# Patient Record
Sex: FEMALE | Race: OTHER | NOT HISPANIC OR LATINO | URBAN - METROPOLITAN AREA
[De-identification: names, ages, dates, MRNs, and addresses within clinical notes are randomized per-mention and may not be internally consistent; named-entity substitution may affect disease eponyms.]

---

## 2019-12-21 PROBLEM — Z00.00 ENCOUNTER FOR PREVENTIVE HEALTH EXAMINATION: Status: ACTIVE | Noted: 2019-12-21

## 2023-02-18 ENCOUNTER — EMERGENCY (EMERGENCY)
Facility: HOSPITAL | Age: 73
LOS: 1 days | Discharge: HOME | End: 2023-02-18
Attending: STUDENT IN AN ORGANIZED HEALTH CARE EDUCATION/TRAINING PROGRAM | Admitting: EMERGENCY MEDICINE
Payer: MEDICARE

## 2023-02-18 VITALS
SYSTOLIC BLOOD PRESSURE: 143 MMHG | DIASTOLIC BLOOD PRESSURE: 67 MMHG | OXYGEN SATURATION: 97 % | HEART RATE: 68 BPM | RESPIRATION RATE: 16 BRPM | TEMPERATURE: 98 F

## 2023-02-18 LAB
ALBUMIN SERPL ELPH-MCNC: 4.3 G/DL — SIGNIFICANT CHANGE UP (ref 3.3–5)
ALP SERPL-CCNC: 71 U/L — SIGNIFICANT CHANGE UP (ref 40–120)
ALT FLD-CCNC: 15 U/L — SIGNIFICANT CHANGE UP (ref 4–33)
ANION GAP SERPL CALC-SCNC: 10 MMOL/L — SIGNIFICANT CHANGE UP (ref 7–14)
APPEARANCE UR: CLEAR — SIGNIFICANT CHANGE UP
APTT BLD: 32.3 SEC — SIGNIFICANT CHANGE UP (ref 27–36.3)
AST SERPL-CCNC: 22 U/L — SIGNIFICANT CHANGE UP (ref 4–32)
BASE EXCESS BLDV CALC-SCNC: 2.2 MMOL/L — SIGNIFICANT CHANGE UP (ref -2–3)
BASOPHILS # BLD AUTO: 0.04 K/UL — SIGNIFICANT CHANGE UP (ref 0–0.2)
BASOPHILS NFR BLD AUTO: 0.7 % — SIGNIFICANT CHANGE UP (ref 0–2)
BILIRUB SERPL-MCNC: 0.2 MG/DL — SIGNIFICANT CHANGE UP (ref 0.2–1.2)
BILIRUB UR-MCNC: NEGATIVE — SIGNIFICANT CHANGE UP
BLOOD GAS VENOUS COMPREHENSIVE RESULT: SIGNIFICANT CHANGE UP
BUN SERPL-MCNC: 15 MG/DL — SIGNIFICANT CHANGE UP (ref 7–23)
CALCIUM SERPL-MCNC: 9.8 MG/DL — SIGNIFICANT CHANGE UP (ref 8.4–10.5)
CHLORIDE BLDV-SCNC: 105 MMOL/L — SIGNIFICANT CHANGE UP (ref 96–108)
CHLORIDE SERPL-SCNC: 104 MMOL/L — SIGNIFICANT CHANGE UP (ref 98–107)
CO2 BLDV-SCNC: 30.8 MMOL/L — HIGH (ref 22–26)
CO2 SERPL-SCNC: 27 MMOL/L — SIGNIFICANT CHANGE UP (ref 22–31)
COLOR SPEC: SIGNIFICANT CHANGE UP
CREAT SERPL-MCNC: 0.69 MG/DL — SIGNIFICANT CHANGE UP (ref 0.5–1.3)
DIFF PNL FLD: NEGATIVE — SIGNIFICANT CHANGE UP
EGFR: 92 ML/MIN/1.73M2 — SIGNIFICANT CHANGE UP
EOSINOPHIL # BLD AUTO: 0.14 K/UL — SIGNIFICANT CHANGE UP (ref 0–0.5)
EOSINOPHIL NFR BLD AUTO: 2.3 % — SIGNIFICANT CHANGE UP (ref 0–6)
FLUAV AG NPH QL: SIGNIFICANT CHANGE UP
FLUBV AG NPH QL: SIGNIFICANT CHANGE UP
GAS PNL BLDV: 138 MMOL/L — SIGNIFICANT CHANGE UP (ref 136–145)
GLUCOSE BLDV-MCNC: 79 MG/DL — SIGNIFICANT CHANGE UP (ref 70–99)
GLUCOSE SERPL-MCNC: 87 MG/DL — SIGNIFICANT CHANGE UP (ref 70–99)
GLUCOSE UR QL: NEGATIVE — SIGNIFICANT CHANGE UP
HCO3 BLDV-SCNC: 29 MMOL/L — SIGNIFICANT CHANGE UP (ref 22–29)
HCT VFR BLD CALC: 38.8 % — SIGNIFICANT CHANGE UP (ref 34.5–45)
HCT VFR BLDA CALC: 40 % — SIGNIFICANT CHANGE UP (ref 34.5–46.5)
HGB BLD CALC-MCNC: 13.2 G/DL — SIGNIFICANT CHANGE UP (ref 11.7–16.1)
HGB BLD-MCNC: 12.7 G/DL — SIGNIFICANT CHANGE UP (ref 11.5–15.5)
IANC: 3.43 K/UL — SIGNIFICANT CHANGE UP (ref 1.8–7.4)
IMM GRANULOCYTES NFR BLD AUTO: 0.2 % — SIGNIFICANT CHANGE UP (ref 0–0.9)
INR BLD: 0.99 RATIO — SIGNIFICANT CHANGE UP (ref 0.88–1.16)
KETONES UR-MCNC: NEGATIVE — SIGNIFICANT CHANGE UP
LACTATE BLDV-MCNC: 0.9 MMOL/L — SIGNIFICANT CHANGE UP (ref 0.5–2)
LEUKOCYTE ESTERASE UR-ACNC: NEGATIVE — SIGNIFICANT CHANGE UP
LYMPHOCYTES # BLD AUTO: 2.08 K/UL — SIGNIFICANT CHANGE UP (ref 1–3.3)
LYMPHOCYTES # BLD AUTO: 33.9 % — SIGNIFICANT CHANGE UP (ref 13–44)
MCHC RBC-ENTMCNC: 28.4 PG — SIGNIFICANT CHANGE UP (ref 27–34)
MCHC RBC-ENTMCNC: 32.7 GM/DL — SIGNIFICANT CHANGE UP (ref 32–36)
MCV RBC AUTO: 86.8 FL — SIGNIFICANT CHANGE UP (ref 80–100)
MONOCYTES # BLD AUTO: 0.44 K/UL — SIGNIFICANT CHANGE UP (ref 0–0.9)
MONOCYTES NFR BLD AUTO: 7.2 % — SIGNIFICANT CHANGE UP (ref 2–14)
NEUTROPHILS # BLD AUTO: 3.43 K/UL — SIGNIFICANT CHANGE UP (ref 1.8–7.4)
NEUTROPHILS NFR BLD AUTO: 55.7 % — SIGNIFICANT CHANGE UP (ref 43–77)
NITRITE UR-MCNC: NEGATIVE — SIGNIFICANT CHANGE UP
NRBC # BLD: 0 /100 WBCS — SIGNIFICANT CHANGE UP (ref 0–0)
NRBC # FLD: 0 K/UL — SIGNIFICANT CHANGE UP (ref 0–0)
PCO2 BLDV: 54 MMHG — HIGH (ref 39–52)
PH BLDV: 7.34 — SIGNIFICANT CHANGE UP (ref 7.32–7.43)
PH UR: 6 — SIGNIFICANT CHANGE UP (ref 5–8)
PLATELET # BLD AUTO: 232 K/UL — SIGNIFICANT CHANGE UP (ref 150–400)
PO2 BLDV: 26 MMHG — SIGNIFICANT CHANGE UP (ref 25–45)
POTASSIUM BLDV-SCNC: 3.4 MMOL/L — LOW (ref 3.5–5.1)
POTASSIUM SERPL-MCNC: 3.8 MMOL/L — SIGNIFICANT CHANGE UP (ref 3.5–5.3)
POTASSIUM SERPL-SCNC: 3.8 MMOL/L — SIGNIFICANT CHANGE UP (ref 3.5–5.3)
PROT SERPL-MCNC: 7.2 G/DL — SIGNIFICANT CHANGE UP (ref 6–8.3)
PROT UR-MCNC: NEGATIVE — SIGNIFICANT CHANGE UP
PROTHROM AB SERPL-ACNC: 11.5 SEC — SIGNIFICANT CHANGE UP (ref 10.5–13.4)
RBC # BLD: 4.47 M/UL — SIGNIFICANT CHANGE UP (ref 3.8–5.2)
RBC # FLD: 12.6 % — SIGNIFICANT CHANGE UP (ref 10.3–14.5)
RSV RNA NPH QL NAA+NON-PROBE: SIGNIFICANT CHANGE UP
SAO2 % BLDV: 37.4 % — LOW (ref 67–88)
SARS-COV-2 RNA SPEC QL NAA+PROBE: SIGNIFICANT CHANGE UP
SODIUM SERPL-SCNC: 141 MMOL/L — SIGNIFICANT CHANGE UP (ref 135–145)
SP GR SPEC: 1 — LOW (ref 1.01–1.05)
TROPONIN T, HIGH SENSITIVITY RESULT: <6 NG/L — SIGNIFICANT CHANGE UP
UROBILINOGEN FLD QL: SIGNIFICANT CHANGE UP
WBC # BLD: 6.14 K/UL — SIGNIFICANT CHANGE UP (ref 3.8–10.5)
WBC # FLD AUTO: 6.14 K/UL — SIGNIFICANT CHANGE UP (ref 3.8–10.5)

## 2023-02-18 PROCEDURE — 73630 X-RAY EXAM OF FOOT: CPT | Mod: 26,LT

## 2023-02-18 PROCEDURE — 71045 X-RAY EXAM CHEST 1 VIEW: CPT | Mod: 26

## 2023-02-18 PROCEDURE — 99223 1ST HOSP IP/OBS HIGH 75: CPT

## 2023-02-18 PROCEDURE — 70450 CT HEAD/BRAIN W/O DYE: CPT | Mod: 26,MA

## 2023-02-18 RX ORDER — TETANUS TOXOID, REDUCED DIPHTHERIA TOXOID AND ACELLULAR PERTUSSIS VACCINE, ADSORBED 5; 2.5; 8; 8; 2.5 [IU]/.5ML; [IU]/.5ML; UG/.5ML; UG/.5ML; UG/.5ML
0.5 SUSPENSION INTRAMUSCULAR ONCE
Refills: 0 | Status: COMPLETED | OUTPATIENT
Start: 2023-02-18 | End: 2023-02-18

## 2023-02-18 RX ORDER — ACETAMINOPHEN 500 MG
975 TABLET ORAL ONCE
Refills: 0 | Status: COMPLETED | OUTPATIENT
Start: 2023-02-18 | End: 2023-02-18

## 2023-02-18 RX ORDER — ACETAMINOPHEN 500 MG
975 TABLET ORAL EVERY 6 HOURS
Refills: 0 | Status: DISCONTINUED | OUTPATIENT
Start: 2023-02-18 | End: 2023-02-22

## 2023-02-18 RX ADMIN — TETANUS TOXOID, REDUCED DIPHTHERIA TOXOID AND ACELLULAR PERTUSSIS VACCINE, ADSORBED 0.5 MILLILITER(S): 5; 2.5; 8; 8; 2.5 SUSPENSION INTRAMUSCULAR at 16:31

## 2023-02-18 RX ADMIN — Medication 975 MILLIGRAM(S): at 18:44

## 2023-02-18 RX ADMIN — Medication 975 MILLIGRAM(S): at 16:30

## 2023-02-18 NOTE — ED ADULT NURSE NOTE - CHIEF COMPLAINT QUOTE
pt had unwitnessed fall while walking on sidewalk today. Pt's  was walking in front of pt and found pt on ground laying on her left side. As per daughter, pt's  stated to daughter that pt was "just coming to" when he noticed her on ground. Pt states "I don't know how I fell". Pt unsure if she passed out but states she just remembers herself on ground. Abrasion noted to left upper lip area. Bruise to right knee, and left great toe pain. Denies any PMHx.

## 2023-02-18 NOTE — ED ADULT TRIAGE NOTE - WAS YOUR LAST COVID-19 VACCINE GREATER THAN OR EQUAL TO TWO MONTHS AGO?
Yes normal/airway patent/breath sounds equal/good air movement/respirations non-labored/clear to auscultation bilaterally/no chest wall tenderness/no intercostal retractions/no rales/no rhonchi/no subcutaneous emphysema/no wheezes

## 2023-02-18 NOTE — ED ADULT TRIAGE NOTE - CHIEF COMPLAINT QUOTE
pt had unwitnessed fall while walking on sidewalk today. Pt's  was walking in front of pt and found pt on ground laying on her left side. As per daughter, pt's  stated to her that pt was "just coming to" when he noticed her on ground. Pt states "I don't know how I fell". Pt unsure if she passed out but states she just remembers herself on ground. Abrasion noted to left upper lip area. Bruise to right knee, and left great toe pain. Denies any PMHx. pt had unwitnessed fall while walking on sidewalk today. Pt's  was walking in front of pt and found pt on ground laying on her left side. As per daughter, pt's  stated to daughter that pt was "just coming to" when he noticed her on ground. Pt states "I don't know how I fell". Pt unsure if she passed out but states she just remembers herself on ground. Abrasion noted to left upper lip area. Bruise to right knee, and left great toe pain. Denies any PMHx.

## 2023-02-18 NOTE — ED PROVIDER NOTE - ATTENDING CONTRIBUTION TO CARE
Dr. Aparicio, Attending Physician-  I performed a face to face bedside interview with patient regarding history of present illness, review of symptoms and past medical history. I completed an independent physical exam.  I have discussed patient's plan of care with the resident.    72F, no pmh, who presents after a syncopal episode. Was walking with her . Then the next thing she remembers is awaking on the ground. Denies any prodromal sxs at all - no preceding chest pain, nausea, vertigo, dizziness, diaphoresis. Has generally felt fatigued since returning from [facility] yesterday.  +facial abrasions from the fall. Last stress test was 3 years ago without focal pathology. No complaints at this time - no headaches, fevers, chills, cough, sputum, cp, sob, belly pain, nvd. Physical: afebrile, well appearing, rrr, upper lip/philtrum abrasions, ctabl, abdomen soft, no le edema. Plan: syncope evaluation - labs, ekg, trop, admit for syncope.

## 2023-02-18 NOTE — ED ADULT NURSE REASSESSMENT NOTE - NS ED NURSE REASSESS COMMENT FT1
A&Ox4. Patient still has pain in her head but reduced from earlier. Respirations even and unlabored, sating 100% on RA. Sinus nathalia on the monitor. Safety precautions in place. Awaiting results from CT scan. Denies N/V, SOB, or distress.

## 2023-02-18 NOTE — ED PROVIDER NOTE - PHYSICAL EXAMINATION
General: WN/WD NAD  Head: abrasions to L upper lip, normocephalic  Eyes: EOM grossly in tact, no scleral icterus, no discharge  ENT: moist mucous membranes  Neurology: A&Ox 3, PERRL, 5/5 strength and normal sensation throughout all four extremities.  no facial asymmetry.  Respiratory: CTAB, no wheezing, normal respiratory effort  CV: RRR, good s1/s2, no S3, Extremities warm and well perfused  Abdominal: Soft, non-distended, non-tender, no masses  Extremities: No edema, no deformities  Skin: warm and dry. No rashes

## 2023-02-18 NOTE — ED CDU PROVIDER INITIAL DAY NOTE - PHYSICAL EXAMINATION
CONSTITUTIONAL:  Well appearing, awake, alert, oriented to person, place, time/situation and in no apparent distress.  Pt. is objectively comfortable appearing and verbalizing in full, clear, effortless sentences.  ENMT: Superficial minor-appearing facial abrasions with no active bleeding noted.  Airway patent.  Nasal mucosa clear.  Moist mucous membranes.  Neck supple, no midline TTP.  EYES:  Clear OU.  EOMI OU.  CARDIAC:  Normal rate, regular rhythm; HR 60 on cardiac monitor on exam.  Heart sounds S1 S2.  No murmurs, gallops, or rubs.  RESPIRATORY:  Breath sounds clear and equal bilaterally.  No wheezes, no rales, no rhonchi.  GASTROINTESTINAL:  Abdomen soft, non-distended, non-tender.  No rebound, no guarding.  NEUROLOGICAL:  Alert and oriented to person/place/time/situation.  No focal deficits; no tremors noted.   MUSCULOSKELETAL:  Range of motion is not limited.  Gait stable.    SKIN:  Skin color unremarkable.  Skin warm, dry.   PSYCHIATRIC:  Alert and oriented to person/place/time/situation.  Mood and affect WNL.  No apparent risk to self or others.

## 2023-02-18 NOTE — ED ADULT NURSE NOTE - NSIMPLEMENTINTERV_GEN_ALL_ED
Implemented All Fall Risk Interventions:  [place] to call system. Call bell, personal items and telephone within reach. Instruct patient to call for assistance. Room bathroom lighting operational. Non-slip footwear when patient is off stretcher. Physically safe environment: no spills, clutter or unnecessary equipment. Stretcher in lowest position, wheels locked, appropriate side rails in place. Provide visual cue, wrist band, yellow gown, etc. Monitor gait and stability. Monitor for mental status changes and reorient to person, place, and time. Review medications for side effects contributing to fall risk. Reinforce activity limits and safety measures with patient and family.

## 2023-02-18 NOTE — ED ADULT NURSE REASSESSMENT NOTE - NS ED NURSE REASSESS COMMENT FT1
Pt alert and orientedx4, in no apparent distress. Pt denies chest pain, SOB, lightheadedness, pain. Pt going to CDU, report given to Rn pt going to spot 1

## 2023-02-18 NOTE — ED ADULT NURSE NOTE - CHIEF COMPLAINT
Refill Authorization Note     is requesting a refill authorization.    Brief assessment and rationale for refill: QUICK DC: RTS 3/18/NEEDS LABS  Amount/Quantity of medication ordered: 90d        Refills Authorized: No           Medication-related problems identified: Requires labs  Medication Therapy Plan: Labs WNL 2/18; DM LCO 10/18 doing well on present regimen; NTBO (CMP/CBC/Lipid)/NTBS (CMP/CBC/Lipid/A1C); quick dc RTS 3/19  Name and strength of medication: JANUMET 50/500MG TABLETS  How patient will take medication: t1t bid   Medication reconciliation completed: No        Comments:    The patient is a 72y Female complaining of fall.

## 2023-02-18 NOTE — ED ADULT NURSE NOTE - OBJECTIVE STATEMENT
Pt received to rm 11 presents s/p fall earlier today with abrasion noted to left upper lip. Pt accompanied by family who reports pt was walking with  when she fell to ground. Pt denies remembering what occurred. Pt currently a&ox4, ambulatory with assistance, respirations even and unlabored, abd soft and non-distended non tender to palpation. Pt denies CP, SOB, fever, chills, dizziness,  symptoms. Resident at bedside assessing pt, will await orders and continue to monitor.

## 2023-02-18 NOTE — ED CDU PROVIDER INITIAL DAY NOTE - OBJECTIVE STATEMENT
71 yo F p/w c/o of syncope earlier today. She has never syncopized before. Denies any symptoms preceding the event.  She states that she woke up on the ground and was confused because she was not sure what occurred.  She is complaining of some facial trauma and abrasions along her upper lip.  According to her.  She was returning from [facility] recently and has felt fatigued since then.  Though denies nausea, vomiting, diarrhea, chest pain, dyspnea, cough, sore throat, dysuria, urinary frequency, fever, chills.  Patient is complaining of some pain in her left foot.  She last had a stress test 3 years ago which was normal but has not had one since. She has been ambulatory since the accident. She has a slight COSTA.    CDU SUMAYA Merino Note----  ED Provider HPI as above, reviewed.  Pt. is a 71 yo female, no stated PMH/PSH; no home meds; presented to the ED s/p syncopal episode, with no prior hx/o syncope in the past.  Pt was walking with spouse when incident occurred; no prodromal symptoms as per pt.  In the ED, VSS; EKG sinus bradycardia @ 59 bmp with no acute/ischemic findings noted.  CBC/coags/CMP all unremarkable; trop <6; VBG: lactate 0.9, pH 7.34; UA: neg leuk/nitrite/ketones.  COVID/FLU/RSV: NotDetected.  CXR and left foot x-rays were performed with no acute pathology noted.  CT head was performed: "....IMPRESSION:  No evidence of acute intracranial hemorrhage, midline shift or CT evidence of acute territorial infarct....".  Pt rec'd tetanus vaccination.  Pt was dispo'd to CDU for continued care plan:  Tele monitoring, echo, Tele Doc of Day evaluation, general observation care / monitoring.  On CDU evaluation, pt with no active c/o.  Pt denies dizziness, weakness, pain/discomfort to any part of her body.  Pt ambulated without complaints of pain or discomfort.

## 2023-02-18 NOTE — ED PROVIDER NOTE - CLINICAL SUMMARY MEDICAL DECISION MAKING FREE TEXT BOX
73 yo F p/w c/o of syncope earlier today. She has never syncopized before. Denies any symptoms preceding the event. Differential diagnosis includes but is not limited to ACS, infection, metabolic derangement, arrhythmia, cardiac abnormality, PTX, vasovagal. Pt well appearing with normal neuro exam. No sig PMH. Given sudden onset with no prodrome c/o cardiac etiology. will place on tele, get ecg, cxr, and ct to eval for pathology. Pending w/u would either place in observation for echo/stress or admit for syncope w/u and continous tele monitoring.

## 2023-02-18 NOTE — ED PROVIDER NOTE - OBJECTIVE STATEMENT
73 yo F p/w c/o of syncope earlier today. She has never syncopized before. Denies any symptoms preceding the event.  She states that she woke up on the ground and was confused because she was not sure what occurred.  She is complaining of some facial trauma and abrasions along her upper lip.  According to her.  She was returning from [facility] recently and has felt fatigued since then.  Though denies nausea, vomiting, diarrhea, chest pain, dyspnea, cough, sore throat, dysuria, urinary frequency, fever, chills.  Patient is complaining of some pain in her left foot.  She last had a stress test 3 years ago which was normal but has not had one since. She has been ambulatory since the accident. She has a slight HA.

## 2023-02-18 NOTE — ED PROVIDER NOTE - PROGRESS NOTE DETAILS
Pily Greenwood PGY2: I received sign out on this patient. I have reassessed patient and agree with the current plan. Will follow-up labs/imaging. CT pending then CDU. Maldonado Alexander, PGY-3- patient received at sign out. Pt pending CT head for syncopal work up. Will place in CDU for echo/tele monitoring. Unclear etiology of syncope. Pt at baseline currently and ambulatory.

## 2023-02-19 VITALS
OXYGEN SATURATION: 100 % | RESPIRATION RATE: 16 BRPM | SYSTOLIC BLOOD PRESSURE: 118 MMHG | HEART RATE: 63 BPM | DIASTOLIC BLOOD PRESSURE: 66 MMHG | TEMPERATURE: 98 F

## 2023-02-19 PROCEDURE — 99238 HOSP IP/OBS DSCHRG MGMT 30/<: CPT

## 2023-02-19 PROCEDURE — 93306 TTE W/DOPPLER COMPLETE: CPT | Mod: 26

## 2023-02-19 NOTE — ED CDU PROVIDER SUBSEQUENT DAY NOTE - RESPIRATORY NEGATIVE STATEMENT, MLM
Addended by: Liane Blas on: 8/8/2022 10:24 AM     Modules accepted: Orders
no chest pain, no cough, and no shortness of breath.

## 2023-02-19 NOTE — ED CDU PROVIDER SUBSEQUENT DAY NOTE - ATTENDING APP SHARED VISIT CONTRIBUTION OF CARE
Otherwise healthy female with no past medical history presents to the emergency department after possible syncope episode yesterday.  Patient had unwitnessed fall was found on the ground and brought to the emergency department.  Patient with facial abrasions, CT head in the emergency department did not show any evidence of intracranial hemorrhage.  Patient had a nonfocal neurological exam.  States there was no prodrome prior to event, denies dizziness, lightheadedness, chest pain, shortness of breath, severe headache, nausea, vomiting.  Patient was sent to the CDU for telemetry, echo for possible syncope.  Patient this morning states she is feeling well, has been ambulating around the observation unit without issue though she does note some left pain around the MTP joint.  Had x-ray done yesterday without any acute fracture.  Patient thinks she may have fallen because she stubbed her toe which caused her to fall, potentially may not have actually been a syncopal episode.  If echocardiogram without any concerning findings patient to be discharged with primary care follow-up.

## 2023-02-19 NOTE — ED CDU PROVIDER DISPOSITION NOTE - PATIENT PORTAL LINK FT
You can access the FollowMyHealth Patient Portal offered by [facility] by registering at the following website: [email or web address]. By joining [employer]’s FollowMyHealth portal, you will also be able to view your health information using other applications (apps) compatible with our system.

## 2023-02-19 NOTE — ED CDU PROVIDER DISPOSITION NOTE - NSFOLLOWUPINSTRUCTIONS_ED_ALL_ED_FT
[facility]  Cardiology  [address] [place] [address]  Phone: [phone]  Fax:   Follow Up Time:     [facility]  Cardiology  [address], [address]  [place] [address]  Phone: [phone]  Fax:   Follow Up Time:     [facility]  Cardiology  [address] [place] [address]  Phone: [phone]  Fax:   Follow Up Time:     Near Syncope    WHAT YOU NEED TO KNOW:    Near syncope, also called presyncope, is the feeling that you may faint (lose consciousness), but you do not. You can control some health conditions that cause near syncope. Your healthcare providers can help you create a plan to manage near syncope and prevent episodes.    DISCHARGE INSTRUCTIONS:    Seek care immediately if:    You have sudden chest pain.    You have trouble breathing or shortness of breath.    You have vision changes, are sweating, and have nausea while you are sitting or lying down.    You feel dizzy or flushed and your heart is fluttering.    You lose consciousness.    You cannot use your arm, hand, foot, or leg, or it feels weak.    You have trouble speaking or understanding others when they speak.  Contact your healthcare provider if:    You have new or worsening symptoms.    Your heart beats faster or slower than usual.    You have questions or concerns about your condition or care.  Medicines:    Medicines may be needed to help your heart pump strongly and regularly. Your healthcare provider may also make changes to any medicines that are causing syncope.    Take your medicine as directed. Contact your healthcare provider if you think your medicine is not helping or if you have side effects. Tell him or her if you are allergic to any medicine. Keep a list of the medicines, vitamins, and herbs you take. Include the amounts, and when and why you take them. Bring the list or the pill bottles to follow-up visits. Carry your medicine list with you in case of an emergency.  Follow up with your healthcare provider as directed: You may need more tests to help find the cause of your near syncope episodes. The tests will help healthcare providers plan the best treatment for you. Write down your questions so you remember to ask them during your visits.    Manage near syncope:    Sit or lie down when needed. This includes when you feel dizzy, your throat is getting tight, and your vision changes. Raise your legs above the level of your heart.    Take slow, deep breaths if you start to breathe faster with anxiety or fear. This can help decrease dizziness and the feeling that you might faint.    Keep a record of your near syncope episodes. Include your symptoms and your activity before and after the episode. The record can help your healthcare provider find the cause of your near syncope and help you manage episodes.  Prevent a near syncope episode:    Move slowly and let yourself get used to one position before you move to another position. This is very important when you change from a lying or sitting position to a standing position. Take some deep breaths before you stand up from a lying position. Stand up slowly. Sudden movements may cause a fainting spell. Sit on the side of the bed or couch for a few minutes before you stand up. If you are on bedrest, try to be upright for about 2 hours each day, or as directed. Do not lock your legs if you are standing for a long period of time. Move your legs and bend your knees to keep blood flowing.    Follow your healthcare provider's recommendations. Your provider may recommend that you drink more liquids to prevent dehydration. You may also need to have more salt to keep your blood pressure from dropping too low and causing syncope. Your provider will tell you how much liquid and sodium to have each day. He or she will also tell you how much physical activity is safe for you. This will depend on what is causing your near syncope.    Watch for signs of low blood sugar. These include hunger, nervousness, sweating, and fast or fluttery heartbeats. Talk with your healthcare provider about ways to keep your blood sugar level steady.    Check your blood pressure often. This is important if you take medicine to lower your blood pressure. Check your blood pressure when you are lying down and when you are standing. Ask how often to check during the day. Keep a record of your blood pressure numbers. Your healthcare provider may use the record to help plan your treatment.  How to take a Blood Pressure      Do not strain if you are constipated. You may faint if you strain to have a bowel movement. Walking is the best way to get your bowels moving. Eat foods high in fiber to make it easier to have a bowel movement. Good examples are high-fiber cereals, beans, vegetables, and whole-grain breads. Prune juice may help make bowel movements softer.    Presíncope    LO QUE NECESITA SABER:    Un presíncope es la sensación de que usted se puede desmayar (perder el conocimiento), jeffrey no lo hace. Usted puede controlar ciertas afecciones médicas que provocan presíncope. Gilda médicos pueden ayudarlo a crear un plan para controlar el presíncope y evitar la ocurrencia de episodios.    INSTRUCCIONES SOBRE EL [facility] HOSPITAL[facility]:    Busque atención médica de inmediato si:    Tiene dolor repentino en el pecho.    Usted tiene dificultad para respirar o le falta el aire.    Usted tiene cambios en la visión, está sudando y tiene náuseas mientras está sentado o acostado.    Usted se siente mareado o acalorado y bella corazón le está aleteando christa si se le fuera a salir.    Usted pierde el conocimiento.    Usted no puede controlar un brazo, alesia mano, un pie o alesia pierna, o los siente débiles.    Tiene problemas para hablar o entender a otros cuando hablan.  Comuníquese con bella médico si:    Usted tiene nuevos síntomas o gilda síntomas empeoran.    Bella corazón late más rápido o más despacio que lo acostumbrado.    Usted tiene preguntas o inquietudes acerca de bella condición o cuidado.  Medicamentos:    Los medicamentospodrían administrarse para ayudar a que bella corazón ai con fuerza y regularidad. Bella médico podría hacer cambios a cualquier medicamento que le esté causando síncope.    Rivers gilda medicamentos christa se le haya indicado.Consulte con bella médico si usted zachery que bella medicamento no le está ayudando o si presenta efectos secundarios. Infórmele si es alérgico a cualquier medicamento. Mantenga alesia lista actualizada de los medicamentos, las vitaminas y los productos herbales que divina. Incluya los siguientes datos de los medicamentos: cantidad, frecuencia y motivo de administración. Traiga con usted la lista o los envases de las píldoras a gilda citas de seguimiento. Lleve la lista de los medicamentos con usted en jorge luis de alesia emergencia.  Acuda a gilda consultas de control con bella médico según le indicaron.Usted podría necesitar más exámenes para averiguar la causa de gilda episodios de presíncope. Los exámenes ayudarán a bella médico a planificar el mejor tratamiento para usted. Anote gilda preguntas para que se acuerde de hacerlas jonelle gilda visitas.    Control del presíncope:    Siéntese o acuéstese cuando sea necesario.Elfers incluye cuando se sienta mareado, bella garganta se cierre o note cambios en bella visión. Eleve gilda piernas por encima del nivel de bella corazón.    Inhale lenta y profundamente si comienza a respirar más rápido a causa de la ansiedad o el temor.Elfers puede disminuir el mareo y la sensación de que se va a desmayar.    Mantenga un registro de los episodios de presíncope.Incluya los síntomas y la actividad que realiza antes y después del episodio. El registro puede ayudar a bella médico a determinar la causa de bella presíncope y ayudarlo a controlar los episodios.  Prevención de un episodio de presíncope:    Muévase lentamente y acostúmbrese a alesia posición antes de moverse a otra.Elfers es muy importante cuando usted se cambie de alesia posición acostada o sentada a alesia posición de pie. Respire profundo varias veces antes de ponerse de pie después de eveline estado acostado. Póngase de pie lentamente. Los movimientos repentinos podrían causar desmayos. Siéntese en el borde de la cama o del sofá por unos minutos antes de ponerse de pie. En jorge luis que esté guardando cama, debe tratar de estar en alesia posición vertical por lo menos por 2 horas todos los días o christa se lo indicaron. No inmovilice las piernas cuando esté de pie jonelle un slivina periodo de tiempo. Mueva las piernas y doble las rodillas para mantener el flujo de genet.    Siga las recomendaciones de bella médico.El médico puede recomendarle que ingiera más líquidos para evitar la deshidratación. Es probable que usted también deba aumentar bella consumo de sal para evitar que bella presión arterial baje demasiado y ocurra un síncope. El médico le dirá cuánto líquido y sodio debe consumir cada día. También le dirá cuánta actividad física es kevin para usted. Elfers dependerá de la causa de bella presíncope.    Esté atento a los signos de bajo nivel de azúcar en la genet.Los signos incluyen hambre, nerviosismo, sudoración y latidos cardíacos rápidos o palpitantes. Consulte con bella médico sobre métodos para mantener estable bella nivel de azúcar en la genet.    Revise bella presión arterial con frecuencia.Elfers es importante si usted divina medicamentos para bajar la presión arterial. Revise bella presión arterial cuando esté acostado y cuando esté de pie. Pregunte con que frecuencia debe tomarse la presión jonelle el día. Mantenga un registro de los valores numéricos de bella presión arterial. Bella médico puede usar la información del registro christa alesia base para planificar bella tratamiento.  Cómo mirlande la presión arterial      No se esfuerce si está estreñido.Puede desmayarse si usted hace fuerza para realizar alesia evacuación intestinal. Caminar es lo mejor que usted puede hacer para que gilda intestinos se muevan. Consuma alimentos ricos en fibra para facilitar las evacuaciones intestinales. Los cereales altos en fibra, los frijoles, las verduras y los panes integrales son buenos ejemplos. El jugo de ciruelas pasas también puede suavizar las evacuaciones intestinales.    No bernice ejercicio al aire jazzy en un día de calor.La combinación de calor y actividad física puede llevar a la deshidratación. Elfers podría causar un síncope.    Do not exercise outside on a hot day. The combination of physical activity and heat can lead to dehydration. This can cause syncope.

## 2023-02-19 NOTE — ED CDU PROVIDER SUBSEQUENT DAY NOTE - PHYSICAL EXAMINATION
CONSTITUTIONAL:  Well appearing, awake, alert, oriented to person, place, time/situation and in no apparent distress.  Pt. is objectively comfortable appearing and verbalizing in full, clear, effortless sentences.  ENMT: Superficial minor-appearing facial abrasions with no active bleeding noted.  Airway patent.  Nasal mucosa clear.  Moist mucous membranes.  Neck supple, no midline TTP.  EYES:  Clear OU.  EOMI OU.  CARDIAC:  Normal rate, regular rhythm.  Heart sounds S1 S2.  No murmurs, gallops, or rubs.  RESPIRATORY:  Breath sounds clear and equal bilaterally.  No wheezes, no rales, no rhonchi.  GASTROINTESTINAL:  Abdomen soft, non-distended, non-tender.  No rebound, no guarding.  NEUROLOGICAL:  Alert and oriented to person/place/time/situation.  No focal deficits; no tremors noted.   MUSCULOSKELETAL:  Range of motion is not limited.  Gait stable.    SKIN:  Skin color unremarkable.  Skin warm, dry.  Facial abrasions, superficial, as above noted.  PSYCHIATRIC:  Alert and oriented to person/place/time/situation.  Mood and affect WNL.  No apparent risk to self or others.

## 2023-02-19 NOTE — ED CDU PROVIDER SUBSEQUENT DAY NOTE - HISTORY
71 yo female, no stated PMH/PSH; no home meds; presented to the ED s/p syncopal episode, with no prior hx/o syncope in the past.  Pt was walking with spouse when incident occurred; no prodromal symptoms as per pt.  Pt sustained facial abrasions when she fell.  In the ED, VSS; EKG sinus bradycardia @ 59 bmp with no acute/ischemic findings noted.  CBC/coags/CMP all unremarkable; trop <6; VBG: lactate 0.9, pH 7.34; UA: neg leuk/nitrite/ketones.  COVID/FLU/RSV: NotDetected.  CXR and left foot x-rays were performed with no acute pathology noted.  CT head was performed: "....IMPRESSION:  No evidence of acute intracranial hemorrhage, midline shift or CT evidence of acute territorial infarct....".  Pt rec'd tetanus vaccination.  Pt was dispo'd to CDU for continued care plan:  Tele monitoring, echo, Tele Doc of Day evaluation, general observation care / monitoring.  In the interim, pt objectively noted to be resting comfortably; pt has been clinically stable; no issues or c/o thus far.

## 2023-02-19 NOTE — ED CDU PROVIDER SUBSEQUENT DAY NOTE - NS ED ATTENDING STATEMENT MOD
This was a shared visit with the TRESSA. I reviewed and verified the documentation and independently performed the documented:

## 2023-02-19 NOTE — ED CDU PROVIDER DISPOSITION NOTE - CLINICAL COURSE
71 yo female, no stated PMH/PSH; no home meds; presented to the ED s/p syncopal episode, with no prior hx/o syncope in the past.  Pt was walking with spouse when incident occurred; no prodromal symptoms as per pt.  Pt sustained facial abrasions when she fell.  In the ED, VSS; EKG sinus bradycardia @ 59 bmp with no acute/ischemic findings noted.  CBC/coags/CMP all unremarkable; trop <6; VBG: lactate 0.9, pH 7.34; UA: neg leuk/nitrite/ketones.  COVID/FLU/RSV: NotDetected.  CXR and left foot x-rays were performed with no acute pathology noted.  CT head was performed: "....IMPRESSION:  No evidence of acute intracranial hemorrhage, midline shift or CT evidence of acute territorial infarct....".  Pt rec'd tetanus vaccination.  Pt was dispo'd to CDU for continued care plan:  Tele monitoring, echo. ECHO wnl - patient stable for dc - will expedite outpt cardiology follow up.

## 2023-02-20 LAB
CULTURE RESULTS: SIGNIFICANT CHANGE UP
SPECIMEN SOURCE: SIGNIFICANT CHANGE UP

## 2023-02-21 RX ORDER — CEPHALEXIN 500 MG
1 CAPSULE ORAL
Qty: 15 | Refills: 0
Start: 2023-02-21 | End: 2023-02-25

## 2023-02-21 NOTE — ED POST DISCHARGE NOTE - RESULT SUMMARY
SUMAYA Estrada: Urine culture 50,000 99,000 group B strep, patient seen status post syncope.  Patient called back, admits to dysuria no fever.  No known drug allergies we will start Keflex 3 times daily for 5 days.  Patient following up with PMD today

## 2023-02-22 PROBLEM — Z00.00 ENCOUNTER FOR PREVENTIVE HEALTH EXAMINATION: Noted: 2023-02-22

## 2023-02-24 ENCOUNTER — APPOINTMENT (OUTPATIENT)
Dept: CARDIOLOGY | Facility: CLINIC | Age: 73
End: 2023-02-24
Payer: MEDICARE

## 2023-02-24 ENCOUNTER — NON-APPOINTMENT (OUTPATIENT)
Age: 73
End: 2023-02-24

## 2023-02-24 VITALS
DIASTOLIC BLOOD PRESSURE: 71 MMHG | OXYGEN SATURATION: 100 % | TEMPERATURE: 98.6 F | SYSTOLIC BLOOD PRESSURE: 106 MMHG | BODY MASS INDEX: 23.39 KG/M2 | WEIGHT: 132 LBS | HEART RATE: 65 BPM | HEIGHT: 63 IN

## 2023-02-24 VITALS — HEART RATE: 59 BPM

## 2023-02-24 VITALS — DIASTOLIC BLOOD PRESSURE: 71 MMHG | SYSTOLIC BLOOD PRESSURE: 106 MMHG

## 2023-02-24 VITALS — SYSTOLIC BLOOD PRESSURE: 110 MMHG | DIASTOLIC BLOOD PRESSURE: 78 MMHG

## 2023-02-24 DIAGNOSIS — Z78.9 OTHER SPECIFIED HEALTH STATUS: ICD-10-CM

## 2023-02-24 DIAGNOSIS — R55 SYNCOPE AND COLLAPSE: ICD-10-CM

## 2023-02-24 PROCEDURE — 99203 OFFICE O/P NEW LOW 30 MIN: CPT | Mod: 25

## 2023-02-24 PROCEDURE — 93000 ELECTROCARDIOGRAM COMPLETE: CPT

## 2023-02-24 NOTE — ASSESSMENT
[FreeTextEntry1] : CONCLUSIONS:\par 1. Mitral annular calcification, otherwise normal mitral\par valve. Minimal mitral regurgitation.\par 2. Normal left ventricular internal dimensions and wall\par thicknesses.\par 3. Normal left ventricular systolic function. No segmental\par wall motion abnormalities.\par 4. Normal right ventricular size and function.\par ------------------------------------------------------------------------\par Confirmed on  2/19/2023 - 13:02:17 by Kip Lyman M.D.,\par FAC, ROSALIE\par \par \par EKG 2/24/2023- Sinus  Bradycardia \par -Prominent R(V1) -nonspecific. \par \par Assessment:\par 1.  Syncope- Mechanical fall.  Patient's  is not sure whether she truly had loss of consciousness.\par \par Recommendations:\par 1.  We will check orthostatics in the office today\par 2.  Patient was recommended regular stress test and a Holter monitor.  Patient and her  state that they have an appointment with her cardiologist Dr. Farfan in [place].  Patient lives in [place].  They are prefer to follow-up with their own cardiologist.

## 2023-02-24 NOTE — HISTORY OF PRESENT ILLNESS
[FreeTextEntry1] : HPI: Patient is a 72-year-old female with no past medical problems presents after hospital discharge for cardiac evaluation.  Patient had an episode of "syncope", she went to [facility] emergency room on February 18,2023, patient and her  were walking on the sidewalk in the afternoon, patient happened to trip and fall, she suffered injury to the toe and she had abrasion on the face.  Patient had a head CT at [facility] ER which was negative.  Patient denies any preceding chest pain, palpitations.  Patient has no prior episodes of syncope.  Patient did not have incontinence of bowel or bladder.  Patient is physically active.  Patient denies any exertional chest pain dyspnea palpitations.\par \par \par \par \par PMH: No diabetes or hypertension.  No prior CAD or CHF.\par \par Social History: Non-smoker.  Denies any alcohol or substance abuse.\par \par Family history: Noncontributory\par \par \par

## 2024-05-05 ENCOUNTER — EMERGENCY (EMERGENCY)
Facility: HOSPITAL | Age: 74
LOS: 1 days | Discharge: HOME | End: 2024-05-05
Attending: EMERGENCY MEDICINE | Admitting: EMERGENCY MEDICINE
Payer: MEDICARE

## 2024-05-05 VITALS
TEMPERATURE: 98 F | DIASTOLIC BLOOD PRESSURE: 80 MMHG | OXYGEN SATURATION: 99 % | SYSTOLIC BLOOD PRESSURE: 165 MMHG | HEART RATE: 89 BPM | RESPIRATION RATE: 18 BRPM

## 2024-05-05 VITALS
RESPIRATION RATE: 16 BRPM | HEART RATE: 78 BPM | TEMPERATURE: 98 F | OXYGEN SATURATION: 98 % | SYSTOLIC BLOOD PRESSURE: 116 MMHG | DIASTOLIC BLOOD PRESSURE: 58 MMHG

## 2024-05-05 LAB
ALBUMIN SERPL ELPH-MCNC: 4.1 G/DL — SIGNIFICANT CHANGE UP (ref 3.3–5)
ALP SERPL-CCNC: 70 U/L — SIGNIFICANT CHANGE UP (ref 40–120)
ALT FLD-CCNC: 18 U/L — SIGNIFICANT CHANGE UP (ref 4–33)
ANION GAP SERPL CALC-SCNC: 9 MMOL/L — SIGNIFICANT CHANGE UP (ref 7–14)
AST SERPL-CCNC: 23 U/L — SIGNIFICANT CHANGE UP (ref 4–32)
BASOPHILS # BLD AUTO: 0.04 K/UL — SIGNIFICANT CHANGE UP (ref 0–0.2)
BASOPHILS NFR BLD AUTO: 0.5 % — SIGNIFICANT CHANGE UP (ref 0–2)
BILIRUB SERPL-MCNC: 0.3 MG/DL — SIGNIFICANT CHANGE UP (ref 0.2–1.2)
BUN SERPL-MCNC: 10 MG/DL — SIGNIFICANT CHANGE UP (ref 7–23)
CALCIUM SERPL-MCNC: 8.9 MG/DL — SIGNIFICANT CHANGE UP (ref 8.4–10.5)
CHLORIDE SERPL-SCNC: 105 MMOL/L — SIGNIFICANT CHANGE UP (ref 98–107)
CO2 SERPL-SCNC: 26 MMOL/L — SIGNIFICANT CHANGE UP (ref 22–31)
CREAT SERPL-MCNC: 0.58 MG/DL — SIGNIFICANT CHANGE UP (ref 0.5–1.3)
EGFR: 95 ML/MIN/1.73M2 — SIGNIFICANT CHANGE UP
EOSINOPHIL # BLD AUTO: 0.18 K/UL — SIGNIFICANT CHANGE UP (ref 0–0.5)
EOSINOPHIL NFR BLD AUTO: 2.3 % — SIGNIFICANT CHANGE UP (ref 0–6)
GLUCOSE SERPL-MCNC: 110 MG/DL — HIGH (ref 70–99)
HCT VFR BLD CALC: 38.4 % — SIGNIFICANT CHANGE UP (ref 34.5–45)
HGB BLD-MCNC: 13.1 G/DL — SIGNIFICANT CHANGE UP (ref 11.5–15.5)
IANC: 4.58 K/UL — SIGNIFICANT CHANGE UP (ref 1.8–7.4)
IMM GRANULOCYTES NFR BLD AUTO: 0.3 % — SIGNIFICANT CHANGE UP (ref 0–0.9)
LIDOCAIN IGE QN: 20 U/L — SIGNIFICANT CHANGE UP (ref 7–60)
LYMPHOCYTES # BLD AUTO: 2.53 K/UL — SIGNIFICANT CHANGE UP (ref 1–3.3)
LYMPHOCYTES # BLD AUTO: 31.7 % — SIGNIFICANT CHANGE UP (ref 13–44)
MCHC RBC-ENTMCNC: 29.4 PG — SIGNIFICANT CHANGE UP (ref 27–34)
MCHC RBC-ENTMCNC: 34.1 GM/DL — SIGNIFICANT CHANGE UP (ref 32–36)
MCV RBC AUTO: 86.3 FL — SIGNIFICANT CHANGE UP (ref 80–100)
MONOCYTES # BLD AUTO: 0.63 K/UL — SIGNIFICANT CHANGE UP (ref 0–0.9)
MONOCYTES NFR BLD AUTO: 7.9 % — SIGNIFICANT CHANGE UP (ref 2–14)
NEUTROPHILS # BLD AUTO: 4.58 K/UL — SIGNIFICANT CHANGE UP (ref 1.8–7.4)
NEUTROPHILS NFR BLD AUTO: 57.3 % — SIGNIFICANT CHANGE UP (ref 43–77)
NRBC # BLD: 0 /100 WBCS — SIGNIFICANT CHANGE UP (ref 0–0)
NRBC # FLD: 0 K/UL — SIGNIFICANT CHANGE UP (ref 0–0)
PLATELET # BLD AUTO: 224 K/UL — SIGNIFICANT CHANGE UP (ref 150–400)
POTASSIUM SERPL-MCNC: 3.7 MMOL/L — SIGNIFICANT CHANGE UP (ref 3.5–5.3)
POTASSIUM SERPL-SCNC: 3.7 MMOL/L — SIGNIFICANT CHANGE UP (ref 3.5–5.3)
PROT SERPL-MCNC: 7 G/DL — SIGNIFICANT CHANGE UP (ref 6–8.3)
RBC # BLD: 4.45 M/UL — SIGNIFICANT CHANGE UP (ref 3.8–5.2)
RBC # FLD: 12.7 % — SIGNIFICANT CHANGE UP (ref 10.3–14.5)
SODIUM SERPL-SCNC: 140 MMOL/L — SIGNIFICANT CHANGE UP (ref 135–145)
WBC # BLD: 7.98 K/UL — SIGNIFICANT CHANGE UP (ref 3.8–10.5)
WBC # FLD AUTO: 7.98 K/UL — SIGNIFICANT CHANGE UP (ref 3.8–10.5)

## 2024-05-05 PROCEDURE — 93010 ELECTROCARDIOGRAM REPORT: CPT

## 2024-05-05 PROCEDURE — 99285 EMERGENCY DEPT VISIT HI MDM: CPT

## 2024-05-05 NOTE — ED PROVIDER NOTE - NSFOLLOWUPINSTRUCTIONS_ED_ALL_ED_FT
Discharge Instructions for Colitis  Diagnosis: Your CT scan revealed signs of colitis, which is inflammation of the colon.    Understanding Your Condition:    Colitis: Colitis can have various causes, including infections, autoimmune diseases, or inflammatory bowel diseases like Crohn's disease or ulcerative colitis. Further testing may be needed to determine the specific cause of your colitis.  Treatment and Management:    Medications: Depending on the cause and severity of your colitis, your doctor may prescribe medications to reduce inflammation, control symptoms, and promote healing. These may include anti-inflammatory drugs, antibiotics, or other medications specific to the underlying cause.    Take zofran 4 mg every 6 hours for nausea.     Dietary Modifications: Your doctor may recommend dietary changes to help manage your symptoms and promote healing. This may involve avoiding certain foods that trigger your symptoms or following a specific diet plan.  Lifestyle Modifications: Managing stress and getting enough rest can also be helpful in managing colitis.  Symptom Management:    Diarrhea: Diarrhea is a common symptom of colitis. It's important to stay hydrated by drinking plenty of fluids. Over-the-counter anti-diarrheal medications may also be helpful.  Abdominal Pain: You may experience abdominal pain or cramping. Applying a heating pad to your abdomen or taking a warm bath may provide relief.  Blood in Stool: If you notice blood in your stool, it's important to inform your doctor.  Follow-up:    Gastroenterologist: A referral to a gastroenterologist is recommended for further evaluation and management of your colitis. They will determine the underlying cause and recommend the most appropriate treatment plan.  Colonoscopy: A colonoscopy may be necessary to visualize the colon and obtain tissue samples for further analysis.  Remember, while colitis can be a challenging condition, there are effective treatment options available. By following these instructions and working closely with your healthcare team, you can manage your symptoms and improve your quality of life.

## 2024-05-05 NOTE — ED ADULT NURSE NOTE - OBJECTIVE STATEMENT
Pt received to room 5 A&ox4, ambulatory, on room air coming to ED c/o rectal bleeding. Pt arrives to ER endorsing diarrhea since 0300 5/5. Pt now experiencing BRB in stool. MD Wagner at bedside for pt assessment, hemorrhoids noted to rectum. Pt respirations even and unlabored, chest rise and fall equal b/l. Pt denies chest pain, HA, SOB, dizziness, N/V/D, fever/chills. #20g placed to RAC, labs collected and sent. Stretcher in lowest posotion, call bell within reach, pt safety maintained.

## 2024-05-05 NOTE — ED PROVIDER NOTE - PHYSICAL EXAMINATION
GENERAL: no acute distress, non-toxic appearing  HEAD: normocephalic, atraumatic  HEENT: normal conjunctiva, oral mucosa moist, neck supple  CARDIAC: regular rate and rhythm, normal S1 and S2,  no appreciable murmurs  PULM: clear to ascultation bilaterally, no crackles, rales, rhonchi, or wheezing  GI: +diffuse ttp, abdomen nondistended, soft, no guarding or rebound tenderness  - hemmorhoids at 6oclock position on rectal exam  NEURO: alert and oriented x 3, normal speech, moving all extremities   MSK: no visible deformities, no peripheral edema, calf tenderness/redness/swelling  SKIN: no visible rashes, dry, well-perfused  PSYCH: appropriate mood and affect

## 2024-05-05 NOTE — ED PROVIDER NOTE - PATIENT PORTAL LINK FT
You can access the FollowMyHealth Patient Portal offered by [facility] by registering at the following website: [email or web address]. By joining [employer]’s FollowMyHealth portal, you will also be able to view your health information using other applications (apps) compatible with our system.

## 2024-05-05 NOTE — ED PROVIDER NOTE - ATTENDING CONTRIBUTION TO CARE
73 year old with lower abd pain and bloody stools Will obtain cbc to rule out anemia. Will obtain CMP to rule out electrolyte abnormalities, liver failure or renal failure. ct belly for colitis vs diverticulit svs sbo. pain control. ua for uti. reassess

## 2024-05-05 NOTE — ED PROVIDER NOTE - CLINICAL SUMMARY MEDICAL DECISION MAKING FREE TEXT BOX
74 yo no med hx pw bloody diarrhea, weakness, nausea, normal vitals, diffuse ttp - will check labs to assess hgb however anticipate likely dc as pt has stable vitals, no ac. Low suspicion for overt gi bleed that is diverticular/avm/upper gi.

## 2024-05-05 NOTE — ED PROVIDER NOTE - OBJECTIVE STATEMENT
72 yo F no past medical history complaining of 4 episodes of bloody bright red diarrhea.  Patient states that she has lower abdominal crampy pain and nausea.  She does not have any vomiting, no fevers although she states that she has had chills all day.  She ate at a restaurant last night with her  however he is not sick.  No travel recently.  Feels weak.  Is not on any anticoagulation.  No prior history of GI bleed.

## 2024-05-06 PROBLEM — Z78.9 OTHER SPECIFIED HEALTH STATUS: Chronic | Status: ACTIVE | Noted: 2023-02-18

## 2024-05-06 LAB
APPEARANCE UR: ABNORMAL
BILIRUB UR-MCNC: NEGATIVE — SIGNIFICANT CHANGE UP
COLOR SPEC: YELLOW — SIGNIFICANT CHANGE UP
DIFF PNL FLD: NEGATIVE — SIGNIFICANT CHANGE UP
GLUCOSE UR QL: NEGATIVE MG/DL — SIGNIFICANT CHANGE UP
KETONES UR-MCNC: NEGATIVE MG/DL — SIGNIFICANT CHANGE UP
LEUKOCYTE ESTERASE UR-ACNC: NEGATIVE — SIGNIFICANT CHANGE UP
NITRITE UR-MCNC: NEGATIVE — SIGNIFICANT CHANGE UP
PH UR: 7 — SIGNIFICANT CHANGE UP (ref 5–8)
PROT UR-MCNC: NEGATIVE MG/DL — SIGNIFICANT CHANGE UP
SP GR SPEC: 1.01 — SIGNIFICANT CHANGE UP (ref 1–1.03)
UROBILINOGEN FLD QL: 0.2 MG/DL — SIGNIFICANT CHANGE UP (ref 0.2–1)

## 2024-05-06 PROCEDURE — 74177 CT ABD & PELVIS W/CONTRAST: CPT | Mod: 26,MC

## 2024-05-06 RX ORDER — ONDANSETRON 8 MG/1
1 TABLET, FILM COATED ORAL
Qty: 12 | Refills: 0
Start: 2024-05-06 | End: 2024-05-09